# Patient Record
Sex: FEMALE | Race: OTHER | HISPANIC OR LATINO | URBAN - METROPOLITAN AREA
[De-identification: names, ages, dates, MRNs, and addresses within clinical notes are randomized per-mention and may not be internally consistent; named-entity substitution may affect disease eponyms.]

---

## 2018-01-25 ENCOUNTER — EMERGENCY (EMERGENCY)
Facility: HOSPITAL | Age: 23
LOS: 1 days | Discharge: HOME | End: 2018-01-25
Attending: EMERGENCY MEDICINE
Payer: COMMERCIAL

## 2018-01-25 VITALS
DIASTOLIC BLOOD PRESSURE: 64 MMHG | TEMPERATURE: 98 F | RESPIRATION RATE: 18 BRPM | OXYGEN SATURATION: 100 % | SYSTOLIC BLOOD PRESSURE: 112 MMHG | HEART RATE: 98 BPM

## 2018-01-25 VITALS
SYSTOLIC BLOOD PRESSURE: 120 MMHG | HEART RATE: 84 BPM | DIASTOLIC BLOOD PRESSURE: 72 MMHG | RESPIRATION RATE: 20 BRPM | OXYGEN SATURATION: 100 %

## 2018-01-25 PROCEDURE — 99283 EMERGENCY DEPT VISIT LOW MDM: CPT

## 2018-01-25 RX ORDER — DEXTROMETHORPHAN HYDROBROMIDE AND PROMETHAZINE HYDROCHLORIDE 15; 6.25 MG/5ML; MG/5ML
5 SYRUP ORAL
Qty: 150 | Refills: 0 | OUTPATIENT
Start: 2018-01-25 | End: 2018-01-31

## 2018-01-25 RX ORDER — AZITHROMYCIN 500 MG/1
1 TABLET, FILM COATED ORAL
Qty: 3 | Refills: 0 | OUTPATIENT
Start: 2018-01-25 | End: 2018-01-27

## 2018-01-25 RX ORDER — DEXTROMETHORPHAN HYDROBROMIDE AND PROMETHAZINE HYDROCHLORIDE 15; 6.25 MG/5ML; MG/5ML
5 SYRUP ORAL
Qty: 140 | Refills: 0 | OUTPATIENT
Start: 2018-01-25 | End: 2018-01-31

## 2018-01-25 NOTE — ED ADULT NURSE NOTE - OBJECTIVE STATEMENT
pt from home c/o of fever, headache cough x1 week pt is alert awake no acute respiratory distress noted

## 2018-01-30 NOTE — ED PROVIDER NOTE - NS ED NOTE AC HIGH RISK COUNTRIES
No

## 2022-10-14 ENCOUNTER — EMERGENCY (EMERGENCY)
Facility: HOSPITAL | Age: 27
LOS: 1 days | Discharge: HOME | End: 2022-10-14
Attending: EMERGENCY MEDICINE
Payer: COMMERCIAL

## 2022-10-14 VITALS
TEMPERATURE: 98 F | SYSTOLIC BLOOD PRESSURE: 127 MMHG | HEIGHT: 63 IN | OXYGEN SATURATION: 100 % | RESPIRATION RATE: 18 BRPM | HEART RATE: 73 BPM | DIASTOLIC BLOOD PRESSURE: 78 MMHG

## 2022-10-14 PROCEDURE — 10060 I&D ABSCESS SIMPLE/SINGLE: CPT

## 2022-10-14 PROCEDURE — 99283 EMERGENCY DEPT VISIT LOW MDM: CPT | Mod: 25

## 2022-10-14 RX ORDER — AZTREONAM 2 G
1 VIAL (EA) INJECTION
Qty: 14 | Refills: 0
Start: 2022-10-14 | End: 2022-10-20

## 2022-10-14 RX ORDER — IBUPROFEN 200 MG
600 TABLET ORAL ONCE
Refills: 0 | Status: COMPLETED | OUTPATIENT
Start: 2022-10-14 | End: 2022-10-14

## 2022-10-14 RX ORDER — IBUPROFEN 200 MG
1 TABLET ORAL
Qty: 20 | Refills: 0
Start: 2022-10-14

## 2022-10-14 RX ADMIN — Medication 600 MILLIGRAM(S): at 22:21

## 2022-10-14 RX ADMIN — Medication 600 MILLIGRAM(S): at 22:01

## 2022-10-14 NOTE — ED PROVIDER NOTE - OBJECTIVE STATEMENT
28 y/o female, w/ no significant PMHx, w/ 1 week of worsening swelling and redness to right axilla. Denies any fever, chills, or drainage. No known drug allergies.

## 2022-10-14 NOTE — ED PROVIDER NOTE - NSFOLLOWUPINSTRUCTIONS_ED_ALL_ED_FT
Skin Abscess       A skin abscess is an infected area on or under your skin that contains a collection of pus and other material. An abscess may also be called a furuncle, carbuncle, or boil. An abscess can occur in or on almost any part of your body.    Some abscesses break open (rupture) on their own. Most continue to get worse unless they are treated. The infection can spread deeper into the body and eventually into your blood, which can make you feel ill. Treatment usually involves draining the abscess.      What are the causes?    An abscess occurs when germs, like bacteria, pass through your skin and cause an infection. This may be caused by:  •A scrape or cut on your skin.      •A puncture wound through your skin, including a needle injection or insect bite.      •Blocked oil or sweat glands.      •Blocked and infected hair follicles.      •A cyst that forms beneath your skin (sebaceous cyst) and becomes infected.        What increases the risk?    This condition is more likely to develop in people who:  •Have a weak body defense system (immune system).      •Have diabetes.      •Have dry and irritated skin.      •Get frequent injections or use illegal IV drugs.      •Have a foreign body in a wound, such as a splinter.      •Have problems with their lymph system or veins.        What are the signs or symptoms?    Symptoms of this condition include:  •A painful, firm bump under the skin.      •A bump with pus at the top. This may break through the skin and drain.      Other symptoms include:  •Redness surrounding the abscess site.      •Warmth.      •Swelling of the lymph nodes (glands) near the abscess.      •Tenderness.      •A sore on the skin.        How is this diagnosed?    This condition may be diagnosed based on:  •A physical exam.      •Your medical history.      •A sample of pus. This may be used to find out what is causing the infection.      •Blood tests.      •Imaging tests, such as an ultrasound, CT scan, or MRI.        How is this treated?    A small abscess that drains on its own may not need treatment. Treatment for larger abscesses may include:  •Moist heat or heat pack applied to the area several times a day.      •A procedure to drain the abscess (incision and drainage).      •Antibiotic medicines. For a severe abscess, you may first get antibiotics through an IV and then change to antibiotics by mouth.        Follow these instructions at home:      Medicines      •Take over-the-counter and prescription medicines only as told by your health care provider.      •If you were prescribed an antibiotic medicine, take it as told by your health care provider. Do not stop taking the antibiotic even if you start to feel better.        Abscess care    •If you have an abscess that has not drained, apply heat to the affected area. Use the heat source that your health care provider recommends, such as a moist heat pack or a heating pad.  •Place a towel between your skin and the heat source.      •Leave the heat on for 20–30 minutes.      •Remove the heat if your skin turns bright red. This is especially important if you are unable to feel pain, heat, or cold. You may have a greater risk of getting burned.      •Follow instructions from your health care provider about how to take care of your abscess. Make sure you:  •Cover the abscess with a bandage (dressing).      •Change your dressing or gauze as told by your health care provider.      •Wash your hands with soap and water before you change the dressing or gauze. If soap and water are not available, use hand .      •Check your abscess every day for signs of a worsening infection. Check for:  •More redness, swelling, or pain.      •More fluid or blood.      •Warmth.      •More pus or a bad smell.        General instructions   •To avoid spreading the infection:  •Do not share personal care items, towels, or hot tubs with others.      •Avoid making skin contact with other people.        •Keep all follow-up visits as told by your health care provider. This is important.        Contact a health care provider if you have:    •More redness, swelling, or pain around your abscess.      •More fluid or blood coming from your abscess.      •Warm skin around your abscess.      •More pus or a bad smell coming from your abscess.      •A fever.      •Muscle aches.      •Chills or a general ill feeling.        Get help right away if you:    •Have severe pain.      •See red streaks on your skin spreading away from the abscess.        Summary    •A skin abscess is an infected area on or under your skin that contains a collection of pus and other material.      •A small abscess that drains on its own may not need treatment.      •Treatment for larger abscesses may include having a procedure to drain the abscess and taking an antibiotic.      This information is not intended to replace advice given to you by your health care provider. Make sure you discuss any questions you have with your health care provider.

## 2022-10-14 NOTE — ED PROVIDER NOTE - IV ALTEPLASE DOOR HIDDEN
show Valtrex Counseling: I discussed with the patient the risks of valacyclovir including but not limited to kidney damage, nausea, vomiting and severe allergy.  The patient understands that if the infection seems to be worsening or is not improving, they are to call.

## 2022-10-14 NOTE — ED PROVIDER NOTE - PATIENT PORTAL LINK FT
You can access the FollowMyHealth Patient Portal offered by [facility] by registering at the following website: [email or web address]. By joining [employer]’s FollowMyHealth portal, you will also be able to view your health information using other applications (apps) compatible with our system.

## 2023-09-20 NOTE — ED ADULT NURSE NOTE - OBJECTIVE STATEMENT
Overall CT Chest with no acute changes - recommend repeat CT in one year to further monitor nodules. CT Due in Sept 2024.    Your PFT shows overall preserved lung function or lung strength.     It would be beneficial to stop tobacco use. Please inform the office if you become interested in the [facility]essation [facility] as discussed in the clinic today.      Continue COPD regimen including Trelegy once per day. Can continue to use Albuterol as needed. Will give spacer in clinic today for Albuterol.    If you need nebulized treatments, can trial half a vial of medicine and see if you tolerate that better.    Continue current prescription medication regiment. Keep follow up appointment as scheduled. Please call the office if you have any questions or concerns.      States she has worsening painful  bump to right armpit x4 days .

## 2023-09-27 NOTE — ED PROVIDER NOTE - PSYCHIATRIC NEGATIVE STATEMENT, MLM
no known mental health issues. Complex Repair And O-L Flap Text: The defect edges were debeveled with a #15 scalpel blade.  The primary defect was closed partially with a complex linear closure.  Given the location of the remaining defect, shape of the defect and the proximity to free margins an O-L flap was deemed most appropriate for complete closure of the defect.  Using a sterile surgical marker, an appropriate flap was drawn incorporating the defect and placing the expected incisions within the relaxed skin tension lines where possible.    The area thus outlined was incised deep to adipose tissue with a #15 scalpel blade.  The skin margins were undermined to an appropriate distance in all directions utilizing iris scissors.